# Patient Record
Sex: FEMALE | Race: WHITE | NOT HISPANIC OR LATINO | Employment: OTHER | URBAN - METROPOLITAN AREA
[De-identification: names, ages, dates, MRNs, and addresses within clinical notes are randomized per-mention and may not be internally consistent; named-entity substitution may affect disease eponyms.]

---

## 2022-08-02 ENCOUNTER — TELEPHONE (OUTPATIENT)
Dept: GASTROENTEROLOGY | Facility: CLINIC | Age: 67
End: 2022-08-02

## 2022-08-02 NOTE — TELEPHONE ENCOUNTER
08/02/22  Screened by: Bridget Diaz    Referring Provider Dr Olsen Keep: There is no height or weight on file to calculate BMI  Has patient been referred for a routine screening Colonoscopy? yes  Is the patient between 39-70 years old? yes      Previous Colonoscopy yes   If yes:    Date: 2013    Facility:     Reason:       SCHEDULING STAFF: If the patient is between 39yrs-47yrs, please advise patient to confirm benefits/coverage with their insurance company for a routine screening colonoscopy, some insurance carriers will only cover at [address] or older  If the patient is over 66years old, please schedule an office visit  Does the patient want to see a Gastroenterologist prior to their procedure OR are they having any GI symptoms? no    Has the patient been hospitalized or had abdominal surgery in the past 6 months? no    Does the patient use supplemental oxygen? no    Does the patient take Coumadin, Lovenox, Plavix, Elliquis, Xarelto, or other blood thinning medication? no    Has the patient had a stroke, cardiac event, or stent placed in the past year? no     PT PASSED OA  PT CAN BE REACHED [phone]  SCHEDULING STAFF: If patient answers NO to above questions, then schedule procedure  If patient answers YES to above questions, then schedule office appointment  If patient is between 45yrs - 49yrs, please advise patient that we will have to confirm benefits & coverage with their insurance company for a routine screening colonoscopy

## 2022-08-03 ENCOUNTER — TELEPHONE (OUTPATIENT)
Dept: GASTROENTEROLOGY | Facility: CLINIC | Age: 67
End: 2022-08-03

## 2022-08-03 DIAGNOSIS — Z12.11 COLON CANCER SCREENING: Primary | ICD-10-CM

## 2022-08-03 RX ORDER — POLYETHYLENE GLYCOL 3350, SODIUM CHLORIDE, SODIUM BICARBONATE, POTASSIUM CHLORIDE 420; 11.2; 5.72; 1.48 G/4L; G/4L; G/4L; G/4L
4000 POWDER, FOR SOLUTION ORAL ONCE
Qty: 4000 ML | Refills: 0 | Status: SHIPPED | OUTPATIENT
Start: 2022-08-03 | End: 2022-08-22

## 2022-08-03 NOTE — TELEPHONE ENCOUNTER
[facility] Assessment    Name: [id number] [facility]  YOB: 1955  Last Height: None  Last weight: None  BMI: None  Procedure: colon  Diagnosis:see order  Date of procedure:   Prep: miralax  and dul  Responsible : yes  Phone#: [phone]  Name completing form: Eleazar Upton  Date form completed: 08/03/22      If the patient answers yes to any of these questions, schedule in a hospital  Are you pregnant: No  Do you rely on a wheelchair for mobility: No  Have you been diagnosed with End Stage Renal Disease (ESRD): No  Do you need oxygen during the day: No  Have you had a heart attack or stroke within the past three months: No  Have you had a seizure within the past three months: No  Have you ever been informed by anesthesia that you have a difficult airway: No  Additional Questions  Have you had any cardiac testing or are under the care of a Cardiologist (see cardiac list): No  Cardiac list:   Do you have an implanted cardiac defibrillator: No (Comment:  This patient should be scheduled in the hospital)    Have any bleeding problems, such as anemia or hemophilia (If patient has H&H result below 8, schedule in hospital   H&H must be within 30 days of procedure): No    Had an organ transplant within the past 3 months: No    Do you have any present infections: No  Do you get short of breath when walking a few blocks: No  Have you been diagnosed with diabetes: No  Comments (provide cardiac provider information if applicable):

## 2022-08-03 NOTE — TELEPHONE ENCOUNTER
Called and lmom asking pt to call back to schedule her colon  I will try calling again in 1 week  If she calls back, please get her scheduled  Thank you

## 2022-08-15 ENCOUNTER — TELEPHONE (OUTPATIENT)
Dept: GASTROENTEROLOGY | Facility: CLINIC | Age: 67
End: 2022-08-15

## 2022-08-15 NOTE — TELEPHONE ENCOUNTER
Spoke to pt confirming pt's colonoscopy scheduled on 8/22/22 with Dr Wyatt Palmer at [facility]  Informed pt [facility] would be calling 1-2 days prior with arrival time  Informed pt of clear liquid diet the day before as well as the bowel cleansing preparation  Informed pt would need a  the day of procedure due to being under sedation  Pt did not have any questions  Advised pt to contact insurance if has any questions regarding coverage of procedure

## 2022-08-22 ENCOUNTER — ANESTHESIA (OUTPATIENT)
Dept: GASTROENTEROLOGY | Facility: AMBULATORY SURGERY CENTER | Age: 67
End: 2022-08-22

## 2022-08-22 ENCOUNTER — HOSPITAL ENCOUNTER (OUTPATIENT)
Dept: GASTROENTEROLOGY | Facility: AMBULATORY SURGERY CENTER | Age: 67
Discharge: HOME | End: 2022-08-22
Payer: MEDICARE

## 2022-08-22 ENCOUNTER — ANESTHESIA EVENT (OUTPATIENT)
Dept: GASTROENTEROLOGY | Facility: AMBULATORY SURGERY CENTER | Age: 67
End: 2022-08-22

## 2022-08-22 VITALS
TEMPERATURE: 97.4 F | HEIGHT: 62 IN | OXYGEN SATURATION: 95 % | RESPIRATION RATE: 18 BRPM | DIASTOLIC BLOOD PRESSURE: 74 MMHG | WEIGHT: 156 LBS | HEART RATE: 77 BPM | BODY MASS INDEX: 28.71 KG/M2 | SYSTOLIC BLOOD PRESSURE: 119 MMHG

## 2022-08-22 DIAGNOSIS — Z86.010 HISTORY OF COLON POLYPS: ICD-10-CM

## 2022-08-22 PROCEDURE — 45385 COLONOSCOPY W/LESION REMOVAL: CPT | Performed by: INTERNAL MEDICINE

## 2022-08-22 PROCEDURE — 88305 TISSUE EXAM BY PATHOLOGIST: CPT | Performed by: PATHOLOGY

## 2022-08-22 PROCEDURE — 45380 COLONOSCOPY AND BIOPSY: CPT | Performed by: INTERNAL MEDICINE

## 2022-08-22 RX ORDER — LORAZEPAM 0.5 MG/1
TABLET ORAL
COMMUNITY
Start: 2022-07-05

## 2022-08-22 RX ORDER — AMLODIPINE BESYLATE 2.5 MG/1
TABLET ORAL
COMMUNITY
Start: 2022-06-15

## 2022-08-22 RX ORDER — LIDOCAINE HYDROCHLORIDE 10 MG/ML
INJECTION, SOLUTION EPIDURAL; INFILTRATION; INTRACAUDAL; PERINEURAL AS NEEDED
Status: DISCONTINUED | OUTPATIENT
Start: 2022-08-22 | End: 2022-08-22

## 2022-08-22 RX ORDER — B-COMPLEX WITH VITAMIN C
TABLET ORAL
COMMUNITY

## 2022-08-22 RX ORDER — SODIUM CHLORIDE 9 MG/ML
20 INJECTION, SOLUTION INTRAVENOUS CONTINUOUS
Status: DISCONTINUED | OUTPATIENT
Start: 2022-08-22 | End: 2022-08-26 | Stop reason: HOSPADM

## 2022-08-22 RX ORDER — PROPOFOL 10 MG/ML
INJECTION, EMULSION INTRAVENOUS AS NEEDED
Status: DISCONTINUED | OUTPATIENT
Start: 2022-08-22 | End: 2022-08-22

## 2022-08-22 RX ORDER — LISINOPRIL 20 MG/1
TABLET ORAL
COMMUNITY
Start: 2022-06-15

## 2022-08-22 RX ORDER — SODIUM CHLORIDE 9 MG/ML
INJECTION, SOLUTION INTRAVENOUS CONTINUOUS PRN
Status: DISCONTINUED | OUTPATIENT
Start: 2022-08-22 | End: 2022-08-22

## 2022-08-22 RX ADMIN — PROPOFOL 100 MG: 10 INJECTION, EMULSION INTRAVENOUS at 08:08

## 2022-08-22 RX ADMIN — PROPOFOL 50 MG: 10 INJECTION, EMULSION INTRAVENOUS at 08:04

## 2022-08-22 RX ADMIN — SODIUM CHLORIDE: 9 INJECTION, SOLUTION INTRAVENOUS at 07:53

## 2022-08-22 RX ADMIN — PROPOFOL 100 MG: 10 INJECTION, EMULSION INTRAVENOUS at 08:03

## 2022-08-22 RX ADMIN — PROPOFOL 50 MG: 10 INJECTION, EMULSION INTRAVENOUS at 08:06

## 2022-08-22 RX ADMIN — PROPOFOL 50 MG: 10 INJECTION, EMULSION INTRAVENOUS at 08:13

## 2022-08-22 RX ADMIN — PROPOFOL 50 MG: 10 INJECTION, EMULSION INTRAVENOUS at 08:10

## 2022-08-22 RX ADMIN — LIDOCAINE HYDROCHLORIDE 50 MG: 10 INJECTION, SOLUTION EPIDURAL; INFILTRATION; INTRACAUDAL; PERINEURAL at 08:03

## 2022-08-22 NOTE — ANESTHESIA PREPROCEDURE EVALUATION
Procedure:  COLONOSCOPY    Relevant Problems   No relevant active problems      Hx of seizures   HTN, baseline SBP 130s per patient  Anxiety     Physical Exam    Airway    Mallampati score: II  TM Distance: >3 FB  Neck ROM: full     Dental   No notable dental hx     Cardiovascular      Pulmonary      Other Findings        Anesthesia Plan  ASA Score- 2     Anesthesia Type- IV sedation with anesthesia with ASA Monitors  Additional Monitors:   Airway Plan:           Plan Factors-Exercise tolerance (METS): >4 METS  Chart reviewed  Patient summary reviewed  Patient is not a current smoker  Obstructive sleep apnea risk education given perioperatively  Induction- intravenous  Postoperative Plan-     Informed Consent- Anesthetic plan and risks discussed with patient  I personally reviewed this patient with the CRNA  Discussed and agreed on the Anesthesia Plan with the CRNA  Davina Beyer

## 2022-08-22 NOTE — ANESTHESIA POSTPROCEDURE EVALUATION
Post-Op Assessment Note    CV Status:  Stable  Pain Score: 0    Pain management: adequate     Mental Status:  Alert and awake   Hydration Status:  Euvolemic   PONV Controlled:  Controlled   Airway Patency:  Patent      Post Op Vitals Reviewed: Yes      Staff: CRNA         No complications documented      BP (!) 82/50 (08/22/22 0821)    Temp     Pulse 78 (08/22/22 0821)   Resp 18 (08/22/22 0821)    SpO2 96 % (08/22/22 0821)

## 2022-08-22 NOTE — H&P
History and Physical - [facility]  Amarilis CASSIE Michaels 73 y o  female MRN: [id number]                  HPI: Desire Parks is a 73y o  year old female who presents for personal history of polyps      REVIEW OF SYSTEMS: Per the HPI, and otherwise unremarkable  Historical Information   Past Medical History:   Diagnosis Date    Anxiety     Cancer [facility])     breast cancer history    Colon polyp     Condyloma     Eczema     Hypertension     Seizures ([address] )          Past Surgical History:   Procedure Laterality Date    COLONOSCOPY      HYSTERECTOMY      MASTECTOMY      bilateral    OOPHORECTOMY      TONSILLECTOMY       Social History   Social History     Substance and Sexual Activity   Alcohol Use Yes    Comment: socially      Social History     Substance and Sexual Activity   Drug Use Never     Social History     Tobacco Use   Smoking Status Former Smoker    Packs/day: 0 25    Years: 5 00    Pack years: 1 25    Quit date: 1    Years since quittin 6   Smokeless Tobacco Never Used     History reviewed  No pertinent family history  Meds/Allergies       Current Outpatient Medications:     amLODIPine (NORVASC) 2 5 mg tablet    halobetasol (ULTRAVATE) 0 05 % cream    lisinopril (ZESTRIL) 20 mg tablet    LORazepam (ATIVAN) 0 5 mg tablet    Zinc 100 MG TABS    Allergies   Allergen Reactions    Diclofenac Sodium GI Intolerance     tablets       Objective     /81   Pulse 102   Temp (!) 97 4 °F (36 3 °C) (Temporal)   Resp 18   Ht 5' 2" (1 575 m)   Wt 70 8 kg (156 lb)   SpO2 93%   BMI 28 53 kg/m²       PHYSICAL EXAM    Gen: NAD  Head: NCAT  CV: RRR  CHEST: Clear  ABD: soft, NT/ND  EXT: no edema      ASSESSMENT/PLAN:  This is a 73y o  year old female here for colonoscopy, and she is stable and optimized for her procedure

## 2024-08-23 ENCOUNTER — HOSPITAL ENCOUNTER (EMERGENCY)
Facility: HOSPITAL | Age: 69
Discharge: HOME | End: 2024-08-23
Attending: EMERGENCY MEDICINE
Payer: MEDICARE

## 2024-08-23 VITALS
TEMPERATURE: 97.6 F | RESPIRATION RATE: 20 BRPM | DIASTOLIC BLOOD PRESSURE: 85 MMHG | SYSTOLIC BLOOD PRESSURE: 169 MMHG | OXYGEN SATURATION: 92 % | HEART RATE: 102 BPM

## 2024-08-23 DIAGNOSIS — T78.2XXA ANAPHYLAXIS, INITIAL ENCOUNTER: Primary | ICD-10-CM

## 2024-08-23 PROCEDURE — 94640 AIRWAY INHALATION TREATMENT: CPT

## 2024-08-23 PROCEDURE — 96372 THER/PROPH/DIAG INJ SC/IM: CPT

## 2024-08-23 PROCEDURE — 96361 HYDRATE IV INFUSION ADD-ON: CPT

## 2024-08-23 PROCEDURE — 99291 CRITICAL CARE FIRST HOUR: CPT | Performed by: EMERGENCY MEDICINE

## 2024-08-23 PROCEDURE — 99282 EMERGENCY DEPT VISIT SF MDM: CPT

## 2024-08-23 PROCEDURE — 96375 TX/PRO/DX INJ NEW DRUG ADDON: CPT

## 2024-08-23 PROCEDURE — 96374 THER/PROPH/DIAG INJ IV PUSH: CPT

## 2024-08-23 RX ORDER — SODIUM CHLORIDE 9 MG/ML
1000 INJECTION, SOLUTION INTRAVENOUS ONCE
Status: CANCELLED | OUTPATIENT
Start: 2024-08-23

## 2024-08-23 RX ORDER — EPINEPHRINE 1 MG/ML
INJECTION, SOLUTION, CONCENTRATE INTRAVENOUS
Status: COMPLETED
Start: 2024-08-23 | End: 2024-08-23

## 2024-08-23 RX ORDER — FAMOTIDINE 10 MG/ML
20 INJECTION, SOLUTION INTRAVENOUS ONCE
Status: COMPLETED | OUTPATIENT
Start: 2024-08-23 | End: 2024-08-23

## 2024-08-23 RX ORDER — TRIAMCINOLONE ACETONIDE 1 MG/G
OINTMENT TOPICAL 2 TIMES DAILY
Status: DISCONTINUED | OUTPATIENT
Start: 2024-08-23 | End: 2024-08-23 | Stop reason: HOSPADM

## 2024-08-23 RX ORDER — ALBUTEROL SULFATE 0.83 MG/ML
2.5 SOLUTION RESPIRATORY (INHALATION) ONCE
Status: COMPLETED | OUTPATIENT
Start: 2024-08-23 | End: 2024-08-23

## 2024-08-23 RX ORDER — EPINEPHRINE 1 MG/ML
0.3 INJECTION, SOLUTION, CONCENTRATE INTRAVENOUS ONCE
Status: COMPLETED | OUTPATIENT
Start: 2024-08-23 | End: 2024-08-23

## 2024-08-23 RX ORDER — DIPHENHYDRAMINE HYDROCHLORIDE 50 MG/ML
25 INJECTION INTRAMUSCULAR; INTRAVENOUS ONCE
Status: COMPLETED | OUTPATIENT
Start: 2024-08-23 | End: 2024-08-23

## 2024-08-23 RX ORDER — METHYLPREDNISOLONE SODIUM SUCCINATE 125 MG/2ML
125 INJECTION, POWDER, LYOPHILIZED, FOR SOLUTION INTRAMUSCULAR; INTRAVENOUS ONCE
Status: COMPLETED | OUTPATIENT
Start: 2024-08-23 | End: 2024-08-23

## 2024-08-23 RX ORDER — SODIUM CHLORIDE 9 MG/ML
1000 INJECTION, SOLUTION INTRAVENOUS ONCE
Status: COMPLETED | OUTPATIENT
Start: 2024-08-23 | End: 2024-08-23

## 2024-08-23 RX ORDER — EPINEPHRINE 0.3 MG/.3ML
0.3 INJECTION SUBCUTANEOUS ONCE
Qty: 0.6 ML | Refills: 0 | Status: SHIPPED | OUTPATIENT
Start: 2024-08-23 | End: 2024-08-23

## 2024-08-23 RX ADMIN — ALBUTEROL SULFATE 2.5 MG: 2.5 SOLUTION RESPIRATORY (INHALATION) at 16:54

## 2024-08-23 RX ADMIN — TRIAMCINOLONE ACETONIDE: 1 OINTMENT TOPICAL at 19:11

## 2024-08-23 RX ADMIN — FAMOTIDINE 20 MG: 10 INJECTION, SOLUTION INTRAVENOUS at 16:42

## 2024-08-23 RX ADMIN — EPINEPHRINE 0.3 MG: 1 INJECTION, SOLUTION, CONCENTRATE INTRAVENOUS at 16:38

## 2024-08-23 RX ADMIN — DIPHENHYDRAMINE HYDROCHLORIDE 25 MG: 50 INJECTION, SOLUTION INTRAMUSCULAR; INTRAVENOUS at 16:40

## 2024-08-23 RX ADMIN — SODIUM CHLORIDE 1000 ML/HR: 0.9 INJECTION, SOLUTION INTRAVENOUS at 17:02

## 2024-08-23 RX ADMIN — METHYLPREDNISOLONE SODIUM SUCCINATE 125 MG: 125 INJECTION, POWDER, FOR SOLUTION INTRAMUSCULAR; INTRAVENOUS at 16:42

## 2024-08-23 NOTE — DISCHARGE INSTRUCTIONS
I sent an EpiPen to your pharmacy.  Make sure you pick this up as soon as possible.  Use the EpiPen if you get stung by a bee and develop any shortness of breath, trouble breathing or feeling like your throat is swelling. If you do use the EpiPen you need to call 911 immediately and come to the hospital to be monitored.    You can take benadryl (25-50mg) every 6 hours as needed for itching or rash.    Make a follow-up appointment with your PCP in the next 2 to 4 days to review your emergency room note.        Continue Benadryl at home.  Use the ointment twice a day until symptoms resolve.

## 2024-08-23 NOTE — ED PROVIDER NOTES
History  Chief Complaint   Patient presents with    Allergic Reaction     Patient presents to ED due to bee sting at approximately 1600 while lying in bed. Pt self administered 25mg PO Benadryl at 1610. Pt noted hives, itching and throat swelling. Pt was stung by bee two weeks ago and only noticed isolated swelling and itchiness at that time. Pt appeared diaphoretic with hives noted throughout upon arrival.      The patient is a 68-year-old female with a past medical history of breast cancer in remission, hypertension, past DVT, PE, pneumothorax and anxiety presenting today 30 minutes after a bee sting to her right arm.  She reports coming in after noticing she had diffuse hives, intense itching, a weird feeling in her throat, trouble breathing and feeling diaphoretic.  She was stung by a bee at approximately 1600 today while laying in bed in her apartment.  Reports that she had noticed a bee flying around earlier.  10 minutes after being stung she took 25 mg of Benadryl as recommended by her .  She was stung on 7/26/2024 and had a localized reaction.  She had isolated itching, swelling and erythema.  She has never had a generalized reaction to bee stings in the past.  She denies any drug or seasonal allergies.  Does have a documented allergy to oral diclofenac which caused nausea. She does take amlodipine, lisinopril and a baby aspirin daily.  She presented to the emergency room with her .  She reported feeling diaphoretic, feeling like her throat may be closing, trouble breathing, diffuse hives and generalized itching.  She denied any nausea, vomiting or diarrhea.  Admitted to chest tightness and shortness of breath.  Denied any pain in her chest.        Prior to Admission Medications   Prescriptions Last Dose Informant Patient Reported? Taking?   LORazepam (ATIVAN) 0.5 mg tablet   Yes No   Zinc 100 MG TABS   Yes No   Sig: Take by mouth   amLODIPine (NORVASC) 2.5 mg tablet   Yes No   halobetasol  (ULTRAVATE) 0.05 % cream   Yes No   Sig: APPLY TO AFFECTED AREA OF SKIN TWICE DAILY   lisinopril (ZESTRIL) 20 mg tablet   Yes No      Facility-Administered Medications: None       Past Medical History:   Diagnosis Date    Anxiety     Cancer (HCC)     breast cancer history    Colon polyp     Condyloma     Eczema     Hypertension     Seizures (HCC)            Past Surgical History:   Procedure Laterality Date    COLONOSCOPY      HYSTERECTOMY      MASTECTOMY      bilateral    OOPHORECTOMY      TONSILLECTOMY         History reviewed. No pertinent family history.  I have reviewed and agree with the history as documented.    E-Cigarette/Vaping    E-Cigarette Use Never User      E-Cigarette/Vaping Substances    Nicotine No     THC No     CBD No     Flavoring No     Other No     Unknown No      Social History     Tobacco Use    Smoking status: Former     Current packs/day: 0.00     Average packs/day: 0.3 packs/day for 5.0 years (1.3 ttl pk-yrs)     Types: Cigarettes     Start date:      Quit date:      Years since quittin.6    Smokeless tobacco: Never   Vaping Use    Vaping status: Never Used   Substance Use Topics    Alcohol use: Yes     Comment: socially     Drug use: Never       Review of Systems   Constitutional:  Positive for diaphoresis. Negative for activity change, appetite change, chills and fever.   HENT:          Feeling like her throat was closing   Respiratory:  Positive for chest tightness and shortness of breath. Negative for cough.    Cardiovascular:  Negative for chest pain and palpitations.   Gastrointestinal:  Negative for abdominal distention, abdominal pain, diarrhea, nausea and vomiting.       Physical Exam  Physical Exam  Vitals and nursing note reviewed.   Constitutional:       General: She is in acute distress.      Appearance: She is diaphoretic.   HENT:      Nose: Nose normal. No congestion or rhinorrhea.      Mouth/Throat:      Mouth: Mucous membranes are moist.      Pharynx:  Oropharynx is clear. No oropharyngeal exudate or posterior oropharyngeal erythema.      Comments: Patent   Eyes:      Extraocular Movements: Extraocular movements intact.      Conjunctiva/sclera: Conjunctivae normal.      Pupils: Pupils are equal, round, and reactive to light.   Cardiovascular:      Rate and Rhythm: Tachycardia present.      Heart sounds: No murmur heard.     No friction rub. No gallop.   Pulmonary:      Effort: Pulmonary effort is normal. No respiratory distress.      Breath sounds: Normal breath sounds. No stridor. No wheezing, rhonchi or rales.      Comments: When the patient presented to the ED she had clear lung sounds.  Upon reassessment 5 minutes later she had diffuse wheezing and rhonchi.  She was given an albuterol nebulizer.  Upon reassessment 5 minutes after the albuterol nebulizer her wheezing and rhonchi had cleared.  Chest:      Chest wall: No tenderness.   Abdominal:      General: Abdomen is flat. Bowel sounds are normal. There is no distension.      Palpations: Abdomen is soft. There is no mass.      Tenderness: There is no abdominal tenderness. There is no guarding or rebound.      Hernia: No hernia is present.   Skin:     Findings: Erythema and rash present.      Comments: Diaphoretic.  Diffuse urticarial rash on the patient's upper and lower extremities as well as on her torso.    Red patch on the pt's right elbow without a stinger visualized.          Vital Signs  ED Triage Vitals   Temperature Pulse Respirations Blood Pressure SpO2   08/23/24 1645 08/23/24 1644 08/23/24 1644 08/23/24 1645 08/23/24 1644   97.6 °F (36.4 °C) (!) 106 18 90/54 94 %      Temp src Heart Rate Source Patient Position - Orthostatic VS BP Location FiO2 (%)   -- 08/23/24 1644 08/23/24 1645 08/23/24 1645 --    Monitor Lying Right arm       Pain Score       08/23/24 1644       No Pain           Vitals:    08/23/24 1730 08/23/24 1800 08/23/24 1830 08/23/24 1900   BP: 130/68 140/71 142/76 150/82   Pulse: 87 90  95 98   Patient Position - Orthostatic VS: Lying Lying  Lying         Visual Acuity      ED Medications  Medications   triamcinolone (KENALOG) 0.1 % ointment ( Topical Given 8/23/24 1911)   methylPREDNISolone sodium succinate (Solu-MEDROL) injection 125 mg (125 mg Intravenous Given 8/23/24 1642)   Famotidine (PF) (PEPCID) injection 20 mg (20 mg Intravenous Given 8/23/24 1642)   diphenhydrAMINE (BENADRYL) injection 25 mg (25 mg Intravenous Given 8/23/24 1640)   EPINEPHrine PF (ADRENALIN) 1 mg/mL injection 0.3 mg (0.3 mg Intramuscular Given 8/23/24 1638)   sodium chloride 0.9 % infusion (0 mL/hr Intravenous Stopped 8/23/24 1815)   albuterol inhalation solution 2.5 mg (2.5 mg Nebulization Given 8/23/24 1654)       Diagnostic Studies  Results Reviewed       None                   No orders to display              Procedures  CriticalCare Time    Date/Time: 8/23/2024 4:30 PM    Performed by: Yasmeen Manrique PA-C  Authorized by: Yasmeen Manrique PA-C    Critical care provider statement:     Critical care time (minutes):  36    Critical care start time:  8/23/2024 4:30 PM    Critical care end time:  8/23/2024 5:00 PM    Critical care time was exclusive of:  Separately billable procedures and treating other patients and teaching time    Critical care was necessary to treat or prevent imminent or life-threatening deterioration of the following conditions:  Respiratory failure and shock    Critical care was time spent personally by me on the following activities:  Review of old charts, obtaining history from patient or surrogate, development of treatment plan with patient or surrogate, evaluation of patient's response to treatment, examination of patient, ordering and performing treatments and interventions and re-evaluation of patient's condition    I assumed direction of critical care for this patient from another provider in my specialty: no    Comments:      Hypoxia requiring 2 L of oxygen nasal cannula.  Patient having  anaphylaxis with hypotension.  Epinephrine, Solu-Medrol, Pepcid, Benadryl, albuterol and fluids ordered while in the room with the patient.  I monitored the patient for 36 minutes while medications were being given.           ED Course  ED Course as of 08/23/24 1948   Fri Aug 23, 2024   1757 I went in to reassess the patient.  Oxygen 94% on room air.  She denies any shortness of breath, chest tightness or feeling like her throat was closing.  Admits to feeling well overall.  However, she does report slight swelling in both hands.   1903 Patient stating that she is having pain and itching where she was stung by the bee.  I did order a steroid ointment to be applied here and at home.   1945 I went in to reassess the patient.  She is feeling well.  Denies any throat swelling, shortness of breath, trouble breathing, palpitations, rashes or itching.  Discussed that she will be discharged in about 1 hour as long as she does not develop any symptoms to complete 4 hours of monitoring.  Her care was signed out to Jocelin Rodriguez MD who did initially assess the patient.  Discharge paperwork complete for when the patient is ready to go.                                 SBIRT 22yo+      Flowsheet Row Most Recent Value   Initial Alcohol Screen: US AUDIT-C     1. How often do you have a drink containing alcohol? 0 Filed at: 08/23/2024 1649   2. How many drinks containing alcohol do you have on a typical day you are drinking?  0 Filed at: 08/23/2024 1649   3b. FEMALE Any Age, or MALE 65+: How often do you have 4 or more drinks on one occassion? 0 Filed at: 08/23/2024 1649   Audit-C Score 0 Filed at: 08/23/2024 1649   ABDIRASHID: How many times in the past year have you...    Used an illegal drug or used a prescription medication for non-medical reasons? Never Filed at: 08/23/2024 1649                      Medical Decision Making  Patient was given 0.3 mg epinephrine in the left lateral leg.  She was then given 20 mg Pepcid IV, 125 mg of  Solu-Medrol and another 25 mg of Benadryl IV.  She was hooked up to 2 L of oxygen after O2 sat was noted to be 88%.  After the medications her hives began to resolve.  She reported feeling improved.  She admitted to her throat feeling better and not feeling short of breath.  I did listen to her lungs again and heard diffuse wheezing and rhonchi.  She was given an albuterol nebulizer.  After the treatment her lung sounds were clear bilaterally in all lung fields.    Patient will be discharged home with a prescription for an EpiPen.  She was provided with a steroid ointment to apply to the site of the bite.  She was told she can continue to take Benadryl as needed.  If any symptoms of anaphylaxis return she will present back to the emergency room.    Amount and/or Complexity of Data Reviewed  External Data Reviewed: ECG.    Risk  Prescription drug management.                 Disposition  Final diagnoses:   Anaphylaxis, initial encounter     Time reflects when diagnosis was documented in both MDM as applicable and the Disposition within this note       Time User Action Codes Description Comment    8/23/2024  6:58 PM Yasmeen Manrique Add [T78.2XXA] Anaphylaxis, initial encounter           ED Disposition       ED Disposition   Discharge    Condition   Stable    Date/Time   Fri Aug 23, 2024 1318    Comment   Amarilis Michaels discharge to home/self care.                   Follow-up Information       Follow up With Specialties Details Why Contact Info    Anatoly Rivera MD Internal Medicine In 1 week for ER follow up [address] [facility]  [address] [place] [id number]  [phone]              Patient's Medications   Discharge Prescriptions    EPINEPHRINE (EPIPEN) 0.3 MG/0.3 ML SOAJ    Inject 0.3 mL (0.3 mg total) into a muscle once for 1 dose       Start Date: 8/23/2024 End Date: 8/23/2024       Order Dose: 0.3 mg       Quantity: 0.6 mL    Refills: 0       No discharge procedures on file.    PDMP Review       None            ED  Provider  Electronically Signed by             Yasmeen Manrique PA-C  08/23/24 1928       Yasmeen Manrique PA-C  08/23/24 1948

## 2024-08-23 NOTE — ED ATTENDING ATTESTATION
8/23/2024  I, Jocelin Rodriguez MD, saw and evaluated the patient. I have discussed the patient with the resident/non-physician practitioner and agree with the resident's/non-physician practitioner's findings, Plan of Care, and MDM as documented in the resident's/non-physician practitioner's note, except where noted. All available labs and Radiology studies were reviewed.  I was present for key portions of any procedure(s) performed by the resident/non-physician practitioner and I was immediately available to provide assistance.       At this point I agree with the current assessment done in the Emergency Department.  I have conducted an independent evaluation of this patient a history and physical is as follows:    68-year-old female with history of breast cancer, hypertension, anxiety presenting for evaluation of allergic reaction.  Patient was stung by a bee about 30 minutes prior to arrival in the emergency department.  She developed hives, itching, difficulty breathing, sweating.  As though her throat was closing.  She took Benadryl prior to arrival.  She has never had an allergic reaction to a bee sting like this.    Exam (performed after medication): Awake, alert.  No acute distress.  Head normocephalic and atraumatic.  Moist mucous membranes.  No intraoral swelling.  Nose normal.  Normal conjunctiva.  Neck supple with normal range of motion.  Heart with regular rate and rhythm.  No respiratory distress.  Lungs clear to auscultation bilaterally.  Abdomen soft, nondistended, nontender.  Skin without rashes.  Extremities with normal range of motion and without swelling.  No focal neurologic deficits.  Site of bee sting noted to the right forearm with no remaining stinger noted.    68-year-old female presenting for an allergic reaction.  Patient with an episode of hypoxia requiring albuterol treatment with improvement.  Now saturating well on room air.  Symptoms greatly improved after medication treatment.   Patient initially with borderline hypotension improved after medication administration as well.  Plan to monitor patient in the emergency department for 4 hours.  Patient was monitored in the emergency department for a total of 4 hours.  She remained asymptomatic and with normal vital signs.  Patient is comfortable discharge at this time.  Her family member has already picked up the prescribed EpiPen.  Return precautions discussed.    ED Course  ED Course as of 08/23/24 2042   Fri Aug 23, 2024   2036 Patient re-evaluated. Continues feeling well and ready to be discharged.         Critical Care Time  CriticalCare Time    Date/Time: 8/23/2024 5:41 PM    Performed by: Jocelin Rodriguez MD  Authorized by: Jocelin Rodriguez MD    Critical care provider statement:     Critical care time (minutes):  36    Critical care start time:  8/23/2024 4:30 PM    Critical care end time:  8/23/2024 6:48 PM    Critical care time was exclusive of:  Separately billable procedures and treating other patients and teaching time    Critical care was necessary to treat or prevent imminent or life-threatening deterioration of the following conditions:  Shock and respiratory failure    Critical care was time spent personally by me on the following activities:  Obtaining history from patient or surrogate, development of treatment plan with patient or surrogate, evaluation of patient's response to treatment, examination of patient, ordering and performing treatments and interventions and re-evaluation of patient's condition    I assumed direction of critical care for this patient from another provider in my specialty: no